# Patient Record
Sex: MALE | Race: OTHER | NOT HISPANIC OR LATINO | URBAN - METROPOLITAN AREA
[De-identification: names, ages, dates, MRNs, and addresses within clinical notes are randomized per-mention and may not be internally consistent; named-entity substitution may affect disease eponyms.]

---

## 2018-10-08 ENCOUNTER — INPATIENT (INPATIENT)
Facility: HOSPITAL | Age: 43
LOS: 1 days | Discharge: HOME | DRG: 287 | End: 2018-10-10
Attending: INTERNAL MEDICINE | Admitting: INTERNAL MEDICINE
Payer: COMMERCIAL

## 2018-10-08 VITALS
HEART RATE: 86 BPM | RESPIRATION RATE: 18 BRPM | SYSTOLIC BLOOD PRESSURE: 158 MMHG | WEIGHT: 259.26 LBS | OXYGEN SATURATION: 95 % | HEIGHT: 69 IN | DIASTOLIC BLOOD PRESSURE: 111 MMHG | TEMPERATURE: 99 F

## 2018-10-08 PROCEDURE — 99285 EMERGENCY DEPT VISIT HI MDM: CPT | Mod: 25

## 2018-10-08 PROCEDURE — 71045 X-RAY EXAM CHEST 1 VIEW: CPT | Mod: 26

## 2018-10-08 PROCEDURE — 93010 ELECTROCARDIOGRAM REPORT: CPT

## 2018-10-08 RX ORDER — AMLODIPINE BESYLATE 2.5 MG/1
10 TABLET ORAL ONCE
Qty: 0 | Refills: 0 | Status: COMPLETED | OUTPATIENT
Start: 2018-10-08 | End: 2018-10-08

## 2018-10-08 RX ORDER — CLOPIDOGREL BISULFATE 75 MG/1
300 TABLET, FILM COATED ORAL ONCE
Qty: 0 | Refills: 0 | Status: COMPLETED | OUTPATIENT
Start: 2018-10-08 | End: 2018-10-08

## 2018-10-08 RX ORDER — METOPROLOL TARTRATE 50 MG
50 TABLET ORAL ONCE
Qty: 0 | Refills: 0 | Status: COMPLETED | OUTPATIENT
Start: 2018-10-08 | End: 2018-10-08

## 2018-10-08 RX ORDER — ASPIRIN/CALCIUM CARB/MAGNESIUM 324 MG
325 TABLET ORAL ONCE
Qty: 0 | Refills: 0 | Status: COMPLETED | OUTPATIENT
Start: 2018-10-08 | End: 2018-10-08

## 2018-10-08 RX ADMIN — Medication 325 MILLIGRAM(S): at 22:20

## 2018-10-08 RX ADMIN — Medication 50 MILLIGRAM(S): at 22:20

## 2018-10-08 RX ADMIN — AMLODIPINE BESYLATE 10 MILLIGRAM(S): 2.5 TABLET ORAL at 22:20

## 2018-10-08 RX ADMIN — CLOPIDOGREL BISULFATE 300 MILLIGRAM(S): 75 TABLET, FILM COATED ORAL at 22:20

## 2018-10-08 NOTE — ED ADULT NURSE NOTE - NSIMPLEMENTINTERV_GEN_ALL_ED
Implemented All Universal Safety Interventions:  [place] to call system. Call bell, personal items and telephone within reach. Instruct patient to call for assistance. Room bathroom lighting operational. Non-slip footwear when patient is off stretcher. Physically safe environment: no spills, clutter or unnecessary equipment. Stretcher in lowest position, wheels locked, appropriate side rails in place.

## 2018-10-08 NOTE — ED PROVIDER NOTE - OBJECTIVE STATEMENT
pt to ed co pain to chest waxes and wanes and pain worse with exertion and better with rest pt has no fam hx however HX of DM, HTN, smokes, sent in by cards for cath in am no fever no dizzy no headache no chills no NVD no chest pain no SOB no shakes no aches no other  injury no other complaints 8/10 no radiation  onset sudden dull pain no pain now

## 2018-10-08 NOTE — ED PROVIDER NOTE - ATTENDING CONTRIBUTION TO CARE
I have seen and examined the pt, reviewed all pertinent clinical data, and I agree with the documentation/care/plan executed by CECIL Rodriguez.

## 2018-10-08 NOTE — ED ADULT NURSE NOTE - OBJECTIVE STATEMENT
pt c/o several weeks of intermittent chest pain.  seen by pmd with inconclusive work up.  states pain is random and sharp.  subsides spontaneously after 10-15 mins.  mild sob with pain.

## 2018-10-09 DIAGNOSIS — E78.5 HYPERLIPIDEMIA, UNSPECIFIED: ICD-10-CM

## 2018-10-09 DIAGNOSIS — Z29.9 ENCOUNTER FOR PROPHYLACTIC MEASURES, UNSPECIFIED: ICD-10-CM

## 2018-10-09 DIAGNOSIS — Z91.89 OTHER SPECIFIED PERSONAL RISK FACTORS, NOT ELSEWHERE CLASSIFIED: ICD-10-CM

## 2018-10-09 DIAGNOSIS — E11.9 TYPE 2 DIABETES MELLITUS WITHOUT COMPLICATIONS: ICD-10-CM

## 2018-10-09 DIAGNOSIS — I10 ESSENTIAL (PRIMARY) HYPERTENSION: ICD-10-CM

## 2018-10-09 DIAGNOSIS — J45.909 UNSPECIFIED ASTHMA, UNCOMPLICATED: ICD-10-CM

## 2018-10-09 LAB
ANION GAP SERPL CALC-SCNC: 12 MMOL/L — SIGNIFICANT CHANGE UP (ref 5–17)
APTT BLD: 30.9 SEC — SIGNIFICANT CHANGE UP (ref 27.5–37.4)
BUN SERPL-MCNC: 16 MG/DL — SIGNIFICANT CHANGE UP (ref 7–23)
CALCIUM SERPL-MCNC: 9.2 MG/DL — SIGNIFICANT CHANGE UP (ref 8.4–10.5)
CHLORIDE SERPL-SCNC: 98 MMOL/L — SIGNIFICANT CHANGE UP (ref 96–108)
CHOLEST SERPL-MCNC: 170 MG/DL — SIGNIFICANT CHANGE UP (ref 10–199)
CK MB CFR SERPL CALC: 3.2 NG/ML — SIGNIFICANT CHANGE UP (ref 0–6.7)
CK SERPL-CCNC: 415 U/L — HIGH (ref 30–200)
CO2 SERPL-SCNC: 27 MMOL/L — SIGNIFICANT CHANGE UP (ref 22–31)
CREAT SERPL-MCNC: 0.97 MG/DL — SIGNIFICANT CHANGE UP (ref 0.5–1.3)
GLUCOSE SERPL-MCNC: 140 MG/DL — HIGH (ref 70–99)
HBA1C BLD-MCNC: 8.8 % — HIGH (ref 4–5.6)
HDLC SERPL-MCNC: 45 MG/DL — SIGNIFICANT CHANGE UP
INR BLD: 1.04 — SIGNIFICANT CHANGE UP (ref 0.88–1.16)
LIPID PNL WITH DIRECT LDL SERPL: 86 MG/DL — SIGNIFICANT CHANGE UP
MAGNESIUM SERPL-MCNC: 1.5 MG/DL — LOW (ref 1.6–2.6)
POTASSIUM SERPL-MCNC: 3.4 MMOL/L — LOW (ref 3.5–5.3)
POTASSIUM SERPL-SCNC: 3.4 MMOL/L — LOW (ref 3.5–5.3)
PROTHROM AB SERPL-ACNC: 11.6 SEC — SIGNIFICANT CHANGE UP (ref 9.8–12.7)
SODIUM SERPL-SCNC: 137 MMOL/L — SIGNIFICANT CHANGE UP (ref 135–145)
TOTAL CHOLESTEROL/HDL RATIO MEASUREMENT: 3.8 RATIO — SIGNIFICANT CHANGE UP (ref 3.4–9.6)
TRIGL SERPL-MCNC: 195 MG/DL — HIGH (ref 10–149)
TROPONIN T SERPL-MCNC: <0.01 NG/ML — SIGNIFICANT CHANGE UP (ref 0–0.01)
TROPONIN T SERPL-MCNC: <0.01 NG/ML — SIGNIFICANT CHANGE UP (ref 0–0.01)
TSH SERPL-MCNC: 4.49 UIU/ML — SIGNIFICANT CHANGE UP (ref 0.35–4.94)

## 2018-10-09 PROCEDURE — 93458 L HRT ARTERY/VENTRICLE ANGIO: CPT | Mod: 26

## 2018-10-09 PROCEDURE — 93571 IV DOP VEL&/PRESS C FLO 1ST: CPT | Mod: 26

## 2018-10-09 RX ORDER — ATORVASTATIN CALCIUM 80 MG/1
80 TABLET, FILM COATED ORAL AT BEDTIME
Qty: 0 | Refills: 0 | Status: DISCONTINUED | OUTPATIENT
Start: 2018-10-09 | End: 2018-10-10

## 2018-10-09 RX ORDER — INSULIN GLARGINE 100 [IU]/ML
23 INJECTION, SOLUTION SUBCUTANEOUS AT BEDTIME
Qty: 0 | Refills: 0 | Status: DISCONTINUED | OUTPATIENT
Start: 2018-10-09 | End: 2018-10-10

## 2018-10-09 RX ORDER — NIFEDIPINE 30 MG
90 TABLET, EXTENDED RELEASE 24 HR ORAL DAILY
Qty: 0 | Refills: 0 | Status: DISCONTINUED | OUTPATIENT
Start: 2018-10-10 | End: 2018-10-10

## 2018-10-09 RX ORDER — ACETAMINOPHEN 500 MG
975 TABLET ORAL ONCE
Qty: 0 | Refills: 0 | Status: COMPLETED | OUTPATIENT
Start: 2018-10-09 | End: 2018-10-09

## 2018-10-09 RX ORDER — PANTOPRAZOLE SODIUM 20 MG/1
40 TABLET, DELAYED RELEASE ORAL
Qty: 0 | Refills: 0 | Status: DISCONTINUED | OUTPATIENT
Start: 2018-10-09 | End: 2018-10-10

## 2018-10-09 RX ORDER — HYDROCORTISONE 20 MG
200 TABLET ORAL ONCE
Qty: 0 | Refills: 0 | Status: COMPLETED | OUTPATIENT
Start: 2018-10-09 | End: 2018-10-09

## 2018-10-09 RX ORDER — INSULIN LISPRO 100/ML
7 VIAL (ML) SUBCUTANEOUS
Qty: 0 | Refills: 0 | COMMUNITY

## 2018-10-09 RX ORDER — IPRATROPIUM/ALBUTEROL SULFATE 18-103MCG
3 AEROSOL WITH ADAPTER (GRAM) INHALATION EVERY 6 HOURS
Qty: 0 | Refills: 0 | Status: DISCONTINUED | OUTPATIENT
Start: 2018-10-09 | End: 2018-10-10

## 2018-10-09 RX ORDER — CLOPIDOGREL BISULFATE 75 MG/1
300 TABLET, FILM COATED ORAL ONCE
Qty: 0 | Refills: 0 | Status: COMPLETED | OUTPATIENT
Start: 2018-10-09 | End: 2018-10-09

## 2018-10-09 RX ORDER — ALBUTEROL 90 UG/1
1 AEROSOL, METERED ORAL
Qty: 0 | Refills: 0 | COMMUNITY

## 2018-10-09 RX ORDER — ATORVASTATIN CALCIUM 80 MG/1
1 TABLET, FILM COATED ORAL
Qty: 0 | Refills: 0 | COMMUNITY

## 2018-10-09 RX ORDER — DEXTROSE 50 % IN WATER 50 %
12.5 SYRINGE (ML) INTRAVENOUS ONCE
Qty: 0 | Refills: 0 | Status: DISCONTINUED | OUTPATIENT
Start: 2018-10-09 | End: 2018-10-09

## 2018-10-09 RX ORDER — NIFEDIPINE 30 MG
60 TABLET, EXTENDED RELEASE 24 HR ORAL ONCE
Qty: 0 | Refills: 0 | Status: COMPLETED | OUTPATIENT
Start: 2018-10-09 | End: 2018-10-09

## 2018-10-09 RX ORDER — ACETAMINOPHEN 500 MG
650 TABLET ORAL EVERY 6 HOURS
Qty: 0 | Refills: 0 | Status: DISCONTINUED | OUTPATIENT
Start: 2018-10-09 | End: 2018-10-10

## 2018-10-09 RX ORDER — MONTELUKAST 4 MG/1
10 TABLET, CHEWABLE ORAL AT BEDTIME
Qty: 0 | Refills: 0 | Status: DISCONTINUED | OUTPATIENT
Start: 2018-10-09 | End: 2018-10-10

## 2018-10-09 RX ORDER — DEXTROSE 50 % IN WATER 50 %
25 SYRINGE (ML) INTRAVENOUS ONCE
Qty: 0 | Refills: 0 | Status: DISCONTINUED | OUTPATIENT
Start: 2018-10-09 | End: 2018-10-09

## 2018-10-09 RX ORDER — ASPIRIN/CALCIUM CARB/MAGNESIUM 324 MG
81 TABLET ORAL DAILY
Qty: 0 | Refills: 0 | Status: DISCONTINUED | OUTPATIENT
Start: 2018-10-09 | End: 2018-10-10

## 2018-10-09 RX ORDER — GLUCAGON INJECTION, SOLUTION 0.5 MG/.1ML
1 INJECTION, SOLUTION SUBCUTANEOUS ONCE
Qty: 0 | Refills: 0 | Status: DISCONTINUED | OUTPATIENT
Start: 2018-10-09 | End: 2018-10-09

## 2018-10-09 RX ORDER — HYDROCHLOROTHIAZIDE 25 MG
25 TABLET ORAL DAILY
Qty: 0 | Refills: 0 | Status: DISCONTINUED | OUTPATIENT
Start: 2018-10-09 | End: 2018-10-10

## 2018-10-09 RX ORDER — MAGNESIUM SULFATE 500 MG/ML
2 VIAL (ML) INJECTION ONCE
Qty: 0 | Refills: 0 | Status: COMPLETED | OUTPATIENT
Start: 2018-10-09 | End: 2018-10-09

## 2018-10-09 RX ORDER — LISINOPRIL 2.5 MG/1
20 TABLET ORAL DAILY
Qty: 0 | Refills: 0 | Status: DISCONTINUED | OUTPATIENT
Start: 2018-10-09 | End: 2018-10-10

## 2018-10-09 RX ORDER — GEMFIBROZIL 600 MG
1 TABLET ORAL
Qty: 0 | Refills: 0 | COMMUNITY

## 2018-10-09 RX ORDER — MONTELUKAST 4 MG/1
1 TABLET, CHEWABLE ORAL
Qty: 0 | Refills: 0 | COMMUNITY

## 2018-10-09 RX ORDER — POTASSIUM CHLORIDE 20 MEQ
40 PACKET (EA) ORAL EVERY 4 HOURS
Qty: 0 | Refills: 0 | Status: COMPLETED | OUTPATIENT
Start: 2018-10-09 | End: 2018-10-09

## 2018-10-09 RX ORDER — GEMFIBROZIL 600 MG
600 TABLET ORAL
Qty: 0 | Refills: 0 | Status: DISCONTINUED | OUTPATIENT
Start: 2018-10-09 | End: 2018-10-10

## 2018-10-09 RX ORDER — METOPROLOL TARTRATE 50 MG
50 TABLET ORAL EVERY 12 HOURS
Qty: 0 | Refills: 0 | Status: DISCONTINUED | OUTPATIENT
Start: 2018-10-09 | End: 2018-10-10

## 2018-10-09 RX ORDER — INSULIN GLARGINE 100 [IU]/ML
45 INJECTION, SOLUTION SUBCUTANEOUS
Qty: 0 | Refills: 0 | COMMUNITY

## 2018-10-09 RX ORDER — SODIUM CHLORIDE 9 MG/ML
1000 INJECTION, SOLUTION INTRAVENOUS
Qty: 0 | Refills: 0 | Status: DISCONTINUED | OUTPATIENT
Start: 2018-10-09 | End: 2018-10-09

## 2018-10-09 RX ORDER — INSULIN GLARGINE 100 [IU]/ML
25 INJECTION, SOLUTION SUBCUTANEOUS ONCE
Qty: 0 | Refills: 0 | Status: COMPLETED | OUTPATIENT
Start: 2018-10-09 | End: 2018-10-09

## 2018-10-09 RX ORDER — BUDESONIDE AND FORMOTEROL FUMARATE DIHYDRATE 160; 4.5 UG/1; UG/1
2 AEROSOL RESPIRATORY (INHALATION)
Qty: 0 | Refills: 0 | Status: DISCONTINUED | OUTPATIENT
Start: 2018-10-09 | End: 2018-10-10

## 2018-10-09 RX ORDER — NIFEDIPINE 30 MG
30 TABLET, EXTENDED RELEASE 24 HR ORAL ONCE
Qty: 0 | Refills: 0 | Status: COMPLETED | OUTPATIENT
Start: 2018-10-09 | End: 2018-10-09

## 2018-10-09 RX ORDER — SODIUM CHLORIDE 9 MG/ML
500 INJECTION INTRAMUSCULAR; INTRAVENOUS; SUBCUTANEOUS
Qty: 0 | Refills: 0 | Status: DISCONTINUED | OUTPATIENT
Start: 2018-10-09 | End: 2018-10-10

## 2018-10-09 RX ORDER — FLUTICASONE PROPIONATE 50 MCG
1 SPRAY, SUSPENSION NASAL DAILY
Qty: 0 | Refills: 0 | Status: DISCONTINUED | OUTPATIENT
Start: 2018-10-09 | End: 2018-10-10

## 2018-10-09 RX ORDER — MOMETASONE FUROATE 50 UG/1
1 SPRAY NASAL
Qty: 0 | Refills: 0 | COMMUNITY

## 2018-10-09 RX ORDER — DEXTROSE 50 % IN WATER 50 %
15 SYRINGE (ML) INTRAVENOUS ONCE
Qty: 0 | Refills: 0 | Status: DISCONTINUED | OUTPATIENT
Start: 2018-10-09 | End: 2018-10-09

## 2018-10-09 RX ORDER — INSULIN LISPRO 100/ML
VIAL (ML) SUBCUTANEOUS EVERY 6 HOURS
Qty: 0 | Refills: 0 | Status: DISCONTINUED | OUTPATIENT
Start: 2018-10-09 | End: 2018-10-09

## 2018-10-09 RX ORDER — ASPIRIN/CALCIUM CARB/MAGNESIUM 324 MG
1 TABLET ORAL
Qty: 0 | Refills: 0 | COMMUNITY

## 2018-10-09 RX ORDER — INSULIN LISPRO 100/ML
VIAL (ML) SUBCUTANEOUS EVERY 6 HOURS
Qty: 0 | Refills: 0 | Status: DISCONTINUED | OUTPATIENT
Start: 2018-10-09 | End: 2018-10-10

## 2018-10-09 RX ORDER — INFLUENZA VIRUS VACCINE 15; 15; 15; 15 UG/.5ML; UG/.5ML; UG/.5ML; UG/.5ML
0.5 SUSPENSION INTRAMUSCULAR ONCE
Qty: 0 | Refills: 0 | Status: COMPLETED | OUTPATIENT
Start: 2018-10-09 | End: 2018-10-10

## 2018-10-09 RX ORDER — FLUTICASONE PROPIONATE 220 MCG
1 AEROSOL WITH ADAPTER (GRAM) INHALATION
Qty: 0 | Refills: 0 | COMMUNITY

## 2018-10-09 RX ORDER — METOPROLOL TARTRATE 50 MG
1 TABLET ORAL
Qty: 0 | Refills: 0 | COMMUNITY

## 2018-10-09 RX ORDER — IPRATROPIUM/ALBUTEROL SULFATE 18-103MCG
3 AEROSOL WITH ADAPTER (GRAM) INHALATION ONCE
Qty: 0 | Refills: 0 | Status: COMPLETED | OUTPATIENT
Start: 2018-10-09 | End: 2018-10-09

## 2018-10-09 RX ORDER — OMEPRAZOLE 10 MG/1
1 CAPSULE, DELAYED RELEASE ORAL
Qty: 0 | Refills: 0 | COMMUNITY

## 2018-10-09 RX ADMIN — Medication 2: at 20:00

## 2018-10-09 RX ADMIN — ATORVASTATIN CALCIUM 80 MILLIGRAM(S): 80 TABLET, FILM COATED ORAL at 22:53

## 2018-10-09 RX ADMIN — Medication 2: at 05:49

## 2018-10-09 RX ADMIN — Medication 975 MILLIGRAM(S): at 05:48

## 2018-10-09 RX ADMIN — Medication 3 MILLILITER(S): at 07:48

## 2018-10-09 RX ADMIN — Medication 60 MILLIGRAM(S): at 11:00

## 2018-10-09 RX ADMIN — Medication 50 MILLIGRAM(S): at 19:54

## 2018-10-09 RX ADMIN — Medication 40 MILLIEQUIVALENT(S): at 10:31

## 2018-10-09 RX ADMIN — Medication 650 MILLIGRAM(S): at 11:01

## 2018-10-09 RX ADMIN — Medication 50 GRAM(S): at 10:31

## 2018-10-09 RX ADMIN — Medication 200 MILLIGRAM(S): at 11:01

## 2018-10-09 RX ADMIN — INSULIN GLARGINE 23 UNIT(S): 100 INJECTION, SOLUTION SUBCUTANEOUS at 22:53

## 2018-10-09 RX ADMIN — Medication 6: at 11:41

## 2018-10-09 RX ADMIN — Medication 81 MILLIGRAM(S): at 14:12

## 2018-10-09 RX ADMIN — MONTELUKAST 10 MILLIGRAM(S): 4 TABLET, CHEWABLE ORAL at 22:53

## 2018-10-09 RX ADMIN — BUDESONIDE AND FORMOTEROL FUMARATE DIHYDRATE 2 PUFF(S): 160; 4.5 AEROSOL RESPIRATORY (INHALATION) at 10:32

## 2018-10-09 RX ADMIN — Medication 30 MILLIGRAM(S): at 05:49

## 2018-10-09 RX ADMIN — Medication 40 MILLIEQUIVALENT(S): at 13:39

## 2018-10-09 RX ADMIN — Medication 3 MILLILITER(S): at 01:06

## 2018-10-09 RX ADMIN — SODIUM CHLORIDE 75 MILLILITER(S): 9 INJECTION INTRAMUSCULAR; INTRAVENOUS; SUBCUTANEOUS at 19:20

## 2018-10-09 RX ADMIN — Medication 650 MILLIGRAM(S): at 11:50

## 2018-10-09 RX ADMIN — Medication 25 MILLIGRAM(S): at 13:39

## 2018-10-09 RX ADMIN — Medication 1 SPRAY(S): at 15:05

## 2018-10-09 RX ADMIN — LISINOPRIL 20 MILLIGRAM(S): 2.5 TABLET ORAL at 13:39

## 2018-10-09 RX ADMIN — Medication 50 MILLIGRAM(S): at 05:50

## 2018-10-09 RX ADMIN — Medication 650 MILLIGRAM(S): at 22:59

## 2018-10-09 RX ADMIN — SODIUM CHLORIDE 75 MILLILITER(S): 9 INJECTION INTRAMUSCULAR; INTRAVENOUS; SUBCUTANEOUS at 13:40

## 2018-10-09 RX ADMIN — Medication 600 MILLIGRAM(S): at 22:53

## 2018-10-09 RX ADMIN — INSULIN GLARGINE 25 UNIT(S): 100 INJECTION, SOLUTION SUBCUTANEOUS at 02:28

## 2018-10-09 RX ADMIN — CLOPIDOGREL BISULFATE 300 MILLIGRAM(S): 75 TABLET, FILM COATED ORAL at 11:00

## 2018-10-09 RX ADMIN — BUDESONIDE AND FORMOTEROL FUMARATE DIHYDRATE 2 PUFF(S): 160; 4.5 AEROSOL RESPIRATORY (INHALATION) at 22:57

## 2018-10-09 NOTE — H&P ADULT - PROBLEM SELECTOR PLAN 2
Pt reports taking Lantus 45 units and Lispro 7 units premeal at home as well as metformin.  Will reduce lantus by 50% while NPO.  - Lantus 25 units  - MISS  - HbA1c

## 2018-10-09 NOTE — H&P ADULT - HISTORY OF PRESENT ILLNESS
44 yo male PMH DM, HTN, HLD, current smoker sent to [facility] ED by cardiologist for exertional chest pain.  Chest pain described as "tightness," intermittent, worse with exertion and better with rest. Denies lightheadedness, diaphoresis, N/V, SOB, radiation of pain, orthopnea, LE edema. No family history history of CAD.  Currently no CP.    Vital Signs: TMax: 98.7, HR (76 - 86), BP: (147/109 - 158/111), RR: 18, SpO2: (93% - 95%) on RA.  Labs unremarkable.  Troponin negative x 1.  EKG with T wave inversion in III and 44 yo male PMH DM, HTN, HLD, nephrotic syndrome, current smoker sent to [facility] ED by cardiologist for exertional chest pain.  Chest pain described as "poking," intermittent, worse with exertion and better with rest.  Pain lasts about 5 min and is associated with SOB. Denies lightheadedness, diaphoresis, N/V, radiation of pain, orthopnea, LE edema. No family history of CAD.  Currently no CP.    Vital Signs: TMax: 98.7, HR (76 - 86), BP: (147/109 - 158/111), RR: 18, SpO2: (93% - 95%) on RA.  Labs unremarkable.  Troponin negative x 1.  EKG with T wave inversion in III and ST elevation in V3.  Patient received aspirin 325 and clopidogrel 300 mg in ED.

## 2018-10-09 NOTE — ED ADULT NURSE REASSESSMENT NOTE - NS ED NURSE REASSESS COMMENT FT1
pt remains aaox3 no deficits no sob no chest pain no n/v.  oob to BR and felt slightly wheezy upon return to stretcher.  no sob.  duo neb given.

## 2018-10-09 NOTE — ED ADULT NURSE REASSESSMENT NOTE - NS ED NURSE REASSESS COMMENT FT1
pt aaox3 no deficits no sob no chest pain no n/v.  oob to BR.  refusing to keep cardiac monitor on at this time.  will let RN apply but then removes himself.  iv intact.  remains npo for cardiac cath.

## 2018-10-09 NOTE — H&P ADULT - PROBLEM SELECTOR PLAN 1
Pt with exertional CP sent in by his cardiologist for cardiac cath.  Currently asymptomatic with troponin negative x 2.  S/p aspirin 325 mg and clopidogrel 300 mg in ED.  - Troponin x 3  - F/u lipid panel, A1c, TSH  - Cardiac catheterization in AM  - Continue Atorvastatin 80 mg daily  - Continue Metoprolol 50 mg BID (patient needs med rec)

## 2018-10-09 NOTE — H&P ADULT - ASSESSMENT
44 yo male PMH DM, HTN, smoker sent to [facility] ED by cardiologist for exertional chest pain admitted for r/o ACS.

## 2018-10-09 NOTE — H&P ADULT - PROBLEM SELECTOR PLAN 3
Pt received amlodipine 10 mg PO in ED for hypertension.  Pt unsure of home regimen, which he states was recently changed.  Appears to be on nifedipine 90 mg and lisinopril/HCTZ.  Will hold ACE pending cath.  - Continue with Nifedipine 90 mg daily

## 2018-10-09 NOTE — H&P ADULT - NSHPLABSRESULTS_GEN_ALL_CORE
LABS:                         14.4   8.0   )-----------( 308      ( 08 Oct 2018 20:15 )             43.1     10-08    135  |  97  |  16  ----------------------------<  219<H>  4.0   |  26  |  0.95    Ca    9.5      08 Oct 2018 20:15    TPro  7.0  /  Alb  3.7  /  TBili  0.3  /  DBili  x   /  AST  25  /  ALT  33  /  AlkPhos  84  10-08    PT/INR - ( 08 Oct 2018 20:15 )   PT: 11.2 sec;   INR: 1.01        PTT - ( 08 Oct 2018 20:15 )  PTT:32.5 sec    CARDIAC MARKERS ( 08 Oct 2018 20:15 )  x     / <0.01 ng/mL / x     / x     / x        RADIOLOGY, EKG & ADDITIONAL TESTS: Reviewed.

## 2018-10-09 NOTE — H&P ADULT - PROBLEM SELECTOR PLAN 7
1) PCP Contacted on Admission: (Y/N) --> Name & Phone #:  2) Date of Contact with PCP: TBD  3) PCP Contacted at Discharge: TBD  4) Summary of Handoff Given to PCP: TBD   5) Post-Discharge Appointment Date: TBD

## 2018-10-09 NOTE — PROGRESS NOTE ADULT - SUBJECTIVE AND OBJECTIVE BOX
Interventional Cardiology PA Precath Note      HPI:   44 yo male PMH DM, HTN, HLD, nephrotic syndrome, current smoker sent to [facility] ED by cardiologist for exertional chest pain.  Chest pain described as"poking," intermittent, worse with exertion and better with rest.  Pain lasts about 5 min and is associated with SOB. Denies lightheadedness, diaphoresis, N/V, radiation of pain, orthopnea, LE edema. No family history of CAD.  Currently no CP. Vital Signs: TMax: 98.7, HR (76 - 86), BP: (147/109 - 158/111), RR: 18, SpO2: (93% - 95%) on RA.  Labs unremarkable.  Troponin negative x 1.  EKG with T wave inversion in III and ST elevation in V3.  Patient received aspirin 325 and clopidogrel 300 mg in ED. (09 Oct 2018 00:16)    Anginal class 3      PMH: DM, HTN, HLD, nephrotic syndrome, active smoker      ALL: NKDA, NKFA. Denies shellfish/Contrast dye allergy.    SocHX: Denies EtoH/TOB/IVDU    MEDS:   ALBUTerol/ipratropium for Nebulization 3 milliLiter(s) Nebulizer every 6 hours PRN  atorvastatin 80 milliGRAM(s) Oral at bedtime  buDESOnide  80 MICROgram(s)/formoterol 4.5 MICROgram(s) Inhaler 2 Puff(s) Inhalation two times a day  insulin lispro (HumaLOG) corrective regimen sliding scale   SubCutaneous every 6 hours  magnesium sulfate  IVPB 2 Gram(s) IV Intermittent once  metoprolol tartrate 50 milliGRAM(s) Oral every 12 hours  potassium chloride    Tablet ER 40 milliEquivalent(s) Oral every 4 hours    T(C): 36.7 (10-09-18 @ 05:57), Max: 37.1 (10-08-18 @ 19:48)  HR: 77 (10-09-18 @ 05:57) (76 - 86)  BP: 179/117 (10-09-18 @ 05:57) (146/101 - 179/117)  RR: 18 (10-09-18 @ 05:57) (18 - 18)  SpO2: 95% (10-09-18 @ 05:57) (93% - 95%)  Wt(kg): 117.6kg      NECK: No JVD, No carotid bruits B/L, +2 Carotid pulses B/L  PULM:  CTA B/L No W/R/R  CARD: RRR, +S1, +S2, No M/R/G  ABD: ND, +BS, NT, no masses  EXT: Warm, pedal edema yes/no, pitting/non-pitting  NEURO: A & O x 3, no focal neurologic deficits  PULSES:	   B	2+       R 2+	                FEM      2+   	                                 DP/PT 2+          Labs:                         14.4   8.0   )-----------( 308      ( 08 Oct 2018 20:15 )             43.1     10-09    137  |  98  |  16  ----------------------------<  140<H>  3.4<L>   |  27  |  0.97    Ca    9.2      09 Oct 2018 07:00  Mg     1.5     10-09    TPro  7.0  /  Alb  3.7  /  TBili  0.3  /  DBili  x   /  AST  25  /  ALT  33  /  AlkPhos  84  10-08    CARDIAC MARKERS ( 09 Oct 2018 07:00 )  x     / <0.01 ng/mL / 415 U/L / x     / 3.2 ng/mL  CARDIAC MARKERS ( 09 Oct 2018 01:00 )  x     / <0.01 ng/mL / x     / x     / x      CARDIAC MARKERS ( 08 Oct 2018 20:15 )  x     / <0.01 ng/mL / x     / x     / x              EKG: with T wave inversion in III and ST elevation in V3					  ASA III				Mallampati class: III	            Anginal Class: III    A/P: 44 yo male PMH DM, HTN, HLD, nephrotic syndrome, current smoker who presents for cardiac catheterization secondary to CCS III anginal symptoms and abnormal EKG    Precath consented  Pt loaded with Plavix 300mg POx1 and ASA 325mg POx1 in ED  Started IVF NS @ 75cc/h      Sedation Plan: Moderate     Patient Is Suitable Candidate For Sedation?  No       Risks & benefits of procedure and sedation and risks and benefits for the alternative therapy have been explained to the patient in layman’s terms including but not limited to: allergic reaction, bleeding, infection, arrhythmia, respiratory compromise, renal and vascular compromise, limb damage, MI, CVA, emergent CABG/Vascular Surgery and death. Informed consent obtained and in chart. Interventional Cardiology PA Precath Note      HPI:   42 yo male PMH DM, HTN, HLD, nephrotic syndrome, current smoker sent to [facility] ED by cardiologist for exertional chest pain.  Chest pain described as"poking," intermittent, worse with exertion and better with rest.  Pain lasts about 5 min and is associated with SOB. Denies lightheadedness, diaphoresis, N/V, radiation of pain, orthopnea, LE edema. No family history of CAD.  Currently no CP. Vital Signs: TMax: 98.7, HR (76 - 86), BP: (147/109 - 158/111), RR: 18, SpO2: (93% - 95%) on RA.  Labs unremarkable.  Troponin negative x 1.  EKG with T wave inversion in III and ST elevation in V3.  Patient received aspirin 325 and clopidogrel 300 mg in ED.     Anginal class 3      PMH: DM, HTN, HLD, nephrotic syndrome, active smoker      ALL: Shellfish allergy; itchiness of throat    SocHX: Denies EtoH/TOB/IVDU    MEDS:   ALBUTerol/ipratropium for Nebulization 3 milliLiter(s) Nebulizer every 6 hours PRN  atorvastatin 80 milliGRAM(s) Oral at bedtime  buDESOnide  80 MICROgram(s)/formoterol 4.5 MICROgram(s) Inhaler 2 Puff(s) Inhalation two times a day  insulin lispro (HumaLOG) corrective regimen sliding scale   SubCutaneous every 6 hours  magnesium sulfate  IVPB 2 Gram(s) IV Intermittent once  metoprolol tartrate 50 milliGRAM(s) Oral every 12 hours  potassium chloride    Tablet ER 40 milliEquivalent(s) Oral every 4 hours    T(C): 36.7 (10-09-18 @ 05:57), Max: 37.1 (10-08-18 @ 19:48)  HR: 77 (10-09-18 @ 05:57) (76 - 86)  BP: 179/117 (10-09-18 @ 05:57) (146/101 - 179/117)  RR: 18 (10-09-18 @ 05:57) (18 - 18)  SpO2: 95% (10-09-18 @ 05:57) (93% - 95%)  Wt(kg): 117.6kg      NECK: No JVD, No carotid bruits B/L, +2 Carotid pulses B/L  PULM:  CTA B/L No W/R/R  CARD: RRR, +S1, +S2, No M/R/G  ABD: ND, +BS, NT, no masses  EXT: Warm, pedal edema no  NEURO: A & O x 3, no focal neurologic deficits  PULSES:	   B	2+       R 2+	                FEM      2+   	                                 DP/PT 2+          Labs:                         14.4   8.0   )-----------( 308      ( 08 Oct 2018 20:15 )             43.1     10-09    137  |  98  |  16  ----------------------------<  140<H>  3.4<L>   |  27  |  0.97    Ca    9.2      09 Oct 2018 07:00  Mg     1.5     10-09    TPro  7.0  /  Alb  3.7  /  TBili  0.3  /  DBili  x   /  AST  25  /  ALT  33  /  AlkPhos  84  10-08    CARDIAC MARKERS ( 09 Oct 2018 07:00 )  x     / <0.01 ng/mL / 415 U/L / x     / 3.2 ng/mL  CARDIAC MARKERS ( 09 Oct 2018 01:00 )  x     / <0.01 ng/mL / x     / x     / x      CARDIAC MARKERS ( 08 Oct 2018 20:15 )  x     / <0.01 ng/mL / x     / x     / x              EKG: with T wave inversion in III and ST elevation in V3					  ASA III				Mallampati class: III	            Anginal Class: III    A/P: 42 yo male PMH DM, HTN, HLD, nephrotic syndrome, current smoker who presents for cardiac catheterization secondary to CCS III anginal symptoms and abnormal EKG    Precath consented  Pt loaded with Plavix 300mg POx1 and ASA 325mg POx1 in ED, given an additional Plavix 300mg POx1 per Dr. Mendoza  Started IVF NS @ 75cc/h      Sedation Plan: Moderate     Patient Is Suitable Candidate For Sedation?  No       Risks & benefits of procedure and sedation and risks and benefits for the alternative therapy have been explained to the patient in layman’s terms including but not limited to: allergic reaction, bleeding, infection, arrhythmia, respiratory compromise, renal and vascular compromise, limb damage, MI, CVA, emergent CABG/Vascular Surgery and death. Informed consent obtained and in chart. Interventional Cardiology PA Precath Note      HPI:   44 yo male PMH DM, HTN, HLD, nephrotic syndrome, current smoker sent to [facility] ED by cardiologist for exertional chest pain.  Chest pain described as"poking," intermittent, worse with exertion and better with rest.  Pain lasts about 5 min and is associated with SOB. Denies lightheadedness, diaphoresis, N/V, radiation of pain, orthopnea, LE edema. No family history of CAD.  Currently no CP. Vital Signs: TMax: 98.7, HR (76 - 86), BP: (147/109 - 158/111), RR: 18, SpO2: (93% - 95%) on RA.  Labs unremarkable.  Troponin negative x 1.  EKG with T wave inversion in III and ST elevation in V3.  Patient received aspirin 325 and clopidogrel 300 mg in ED.     Anginal class 3      PMH: DM, HTN, HLD, nephrotic syndrome, active smoker      ALL: Shellfish allergy; itchiness of throat, swollen glands and SOB    SocHX: Denies EtoH/TOB/IVDU    MEDS:   ALBUTerol/ipratropium for Nebulization 3 milliLiter(s) Nebulizer every 6 hours PRN  atorvastatin 80 milliGRAM(s) Oral at bedtime  buDESOnide  80 MICROgram(s)/formoterol 4.5 MICROgram(s) Inhaler 2 Puff(s) Inhalation two times a day  insulin lispro (HumaLOG) corrective regimen sliding scale   SubCutaneous every 6 hours  magnesium sulfate  IVPB 2 Gram(s) IV Intermittent once  metoprolol tartrate 50 milliGRAM(s) Oral every 12 hours  potassium chloride    Tablet ER 40 milliEquivalent(s) Oral every 4 hours    T(C): 36.7 (10-09-18 @ 05:57), Max: 37.1 (10-08-18 @ 19:48)  HR: 77 (10-09-18 @ 05:57) (76 - 86)  BP: 179/117 (10-09-18 @ 05:57) (146/101 - 179/117)  RR: 18 (10-09-18 @ 05:57) (18 - 18)  SpO2: 95% (10-09-18 @ 05:57) (93% - 95%)  Wt(kg): 117.6kg      NECK: No JVD, No carotid bruits B/L, +2 Carotid pulses B/L  PULM:  CTA B/L No W/R/R  CARD: RRR, +S1, +S2, No M/R/G  ABD: ND, +BS, NT, no masses  EXT: Warm, pedal edema no  NEURO: A & O x 3, no focal neurologic deficits  PULSES:	   B	2+       R 2+	                FEM      2+   	                                 DP/PT 2+          Labs:                         14.4   8.0   )-----------( 308      ( 08 Oct 2018 20:15 )             43.1     10-09    137  |  98  |  16  ----------------------------<  140<H>  3.4<L>   |  27  |  0.97    Ca    9.2      09 Oct 2018 07:00  Mg     1.5     10-09    TPro  7.0  /  Alb  3.7  /  TBili  0.3  /  DBili  x   /  AST  25  /  ALT  33  /  AlkPhos  84  10-08    CARDIAC MARKERS ( 09 Oct 2018 07:00 )  x     / <0.01 ng/mL / 415 U/L / x     / 3.2 ng/mL  CARDIAC MARKERS ( 09 Oct 2018 01:00 )  x     / <0.01 ng/mL / x     / x     / x      CARDIAC MARKERS ( 08 Oct 2018 20:15 )  x     / <0.01 ng/mL / x     / x     / x              EKG: with T wave inversion in III and ST elevation in V3					  ASA III				Mallampati class: III	            Anginal Class: III    A/P: 44 yo male PMH DM, HTN, HLD, nephrotic syndrome, current smoker who presents for cardiac catheterization secondary to CCS III anginal symptoms and abnormal EKG    Precath consented  Pt loaded with Plavix 300mg POx1 and ASA 325mg POx1 in ED, given an additional Plavix 300mg POx1 per Dr. Lizabeth Panchal aware  Started IVF NS @ 75cc/h  Pt with shellfish allergy; premedicated with IV Solu-cortef 200mg x1 per Dr. Barone      Sedation Plan: Moderate     Patient Is Suitable Candidate For Sedation?  No       Risks & benefits of procedure and sedation and risks and benefits for the alternative therapy have been explained to the patient in layman’s terms including but not limited to: allergic reaction, bleeding, infection, arrhythmia, respiratory compromise, renal and vascular compromise, limb damage, MI, CVA, emergent CABG/Vascular Surgery and death. Informed consent obtained and in chart.

## 2018-10-09 NOTE — H&P ADULT - NSHPPHYSICALEXAM_GEN_ALL_CORE
VITAL SIGNS:  T(C): 36.7 (10-08-18 @ 21:53), Max: 37.1 (10-08-18 @ 19:48)  T(F): 98 (10-08-18 @ 21:53), Max: 98.7 (10-08-18 @ 19:48)  HR: 76 (10-08-18 @ 21:53) (76 - 86)  BP: 147/109 (10-08-18 @ 21:53) (147/109 - 158/111)  BP(mean): --  RR: 18 (10-08-18 @ 21:53) (18 - 18)  SpO2: 93% (10-08-18 @ 21:53) (93% - 95%)  Wt(kg): --    PHYSICAL EXAM:    Constitutional: WDWN resting comfortably in bed; NAD  Head: NC/AT  Eyes: PERRL, EOMI, clear conjunctiva  ENT: no nasal discharge; uvula midline, no oropharyngeal erythema or exudates; MMM  Neck: supple; no JVD  Respiratory: CTA B/L; no W/R/R, no retractions  Cardiac: +S1/S2; RRR; no M/R/G  Gastrointestinal: soft, NT/ND; no rebound or guarding; +BSx4  Extremities: WWP, no clubbing or cyanosis; no peripheral edema  Musculoskeletal: NROM x4; no joint swelling, tenderness or erythema  Vascular: 2+ radial, DP/PT pulses B/L  Dermatologic: skin warm, dry and intact; no rashes, wounds, or scars  Lymphatic: no submandibular or cervical LAD  Neurologic: AAOx3; CNII-XII grossly intact; no focal deficits  Psychiatric: affect and characteristics of appearance, verbalizations, behaviors are appropriate VITAL SIGNS:  T(C): 36.7 (10-08-18 @ 21:53), Max: 37.1 (10-08-18 @ 19:48)  T(F): 98 (10-08-18 @ 21:53), Max: 98.7 (10-08-18 @ 19:48)  HR: 76 (10-08-18 @ 21:53) (76 - 86)  BP: 147/109 (10-08-18 @ 21:53) (147/109 - 158/111)  BP(mean): --  RR: 18 (10-08-18 @ 21:53) (18 - 18)  SpO2: 93% (10-08-18 @ 21:53) (93% - 95%)  Wt(kg): --    PHYSICAL EXAM:    Constitutional: obese male resting comfortably in bed; NAD  Head: NC/AT  Eyes: PERRL, EOMI, clear conjunctiva  ENT: no nasal discharge; uvula midline, no oropharyngeal erythema or exudates; MMM  Neck: supple; no JVD  Respiratory: CTA B/L; no W/R/R, no retractions  Cardiac: +S1/S2; RRR; no M/R/G  Gastrointestinal: soft, NT/ND; no rebound or guarding; +BSx4  Extremities: WWP, no clubbing or cyanosis; no peripheral edema  Musculoskeletal: NROM x4; no joint swelling, tenderness or erythema  Vascular: 2+ radial, DP/PT pulses B/L  Dermatologic: skin warm, dry and intact; no rashes, wounds, or scars  Lymphatic: no submandibular or cervical LAD  Neurologic: AAOx3; CNII-XII grossly intact; no focal deficits  Psychiatric: affect and characteristics of appearance, verbalizations, behaviors are appropriate

## 2018-10-10 VITALS — TEMPERATURE: 98 F

## 2018-10-10 LAB
ANION GAP SERPL CALC-SCNC: 14 MMOL/L — SIGNIFICANT CHANGE UP (ref 5–17)
BUN SERPL-MCNC: 19 MG/DL — SIGNIFICANT CHANGE UP (ref 7–23)
CALCIUM SERPL-MCNC: 9.2 MG/DL — SIGNIFICANT CHANGE UP (ref 8.4–10.5)
CHLORIDE SERPL-SCNC: 99 MMOL/L — SIGNIFICANT CHANGE UP (ref 96–108)
CO2 SERPL-SCNC: 24 MMOL/L — SIGNIFICANT CHANGE UP (ref 22–31)
CREAT SERPL-MCNC: 0.97 MG/DL — SIGNIFICANT CHANGE UP (ref 0.5–1.3)
GLUCOSE BLDC GLUCOMTR-MCNC: 142 MG/DL — HIGH (ref 70–99)
GLUCOSE BLDC GLUCOMTR-MCNC: 184 MG/DL — HIGH (ref 70–99)
GLUCOSE SERPL-MCNC: 136 MG/DL — HIGH (ref 70–99)
HCT VFR BLD CALC: 46.3 % — SIGNIFICANT CHANGE UP (ref 39–50)
HGB BLD-MCNC: 15.4 G/DL — SIGNIFICANT CHANGE UP (ref 13–17)
MAGNESIUM SERPL-MCNC: 1.7 MG/DL — SIGNIFICANT CHANGE UP (ref 1.6–2.6)
MCHC RBC-ENTMCNC: 27.4 PG — SIGNIFICANT CHANGE UP (ref 27–34)
MCHC RBC-ENTMCNC: 33.3 G/DL — SIGNIFICANT CHANGE UP (ref 32–36)
MCV RBC AUTO: 82.2 FL — SIGNIFICANT CHANGE UP (ref 80–100)
PLATELET # BLD AUTO: 334 K/UL — SIGNIFICANT CHANGE UP (ref 150–400)
POTASSIUM SERPL-MCNC: 3.4 MMOL/L — LOW (ref 3.5–5.3)
POTASSIUM SERPL-SCNC: 3.4 MMOL/L — LOW (ref 3.5–5.3)
RBC # BLD: 5.63 M/UL — SIGNIFICANT CHANGE UP (ref 4.2–5.8)
RBC # FLD: 13.6 % — SIGNIFICANT CHANGE UP (ref 10.3–16.9)
SODIUM SERPL-SCNC: 137 MMOL/L — SIGNIFICANT CHANGE UP (ref 135–145)
WBC # BLD: 9.7 K/UL — SIGNIFICANT CHANGE UP (ref 3.8–10.5)
WBC # FLD AUTO: 9.7 K/UL — SIGNIFICANT CHANGE UP (ref 3.8–10.5)

## 2018-10-10 RX ORDER — NIFEDIPINE 30 MG
1 TABLET, EXTENDED RELEASE 24 HR ORAL
Qty: 0 | Refills: 0 | COMMUNITY
Start: 2018-10-10

## 2018-10-10 RX ORDER — MAGNESIUM SULFATE 500 MG/ML
1 VIAL (ML) INJECTION ONCE
Qty: 0 | Refills: 0 | Status: COMPLETED | OUTPATIENT
Start: 2018-10-10 | End: 2018-10-10

## 2018-10-10 RX ORDER — IBUPROFEN 200 MG
1 TABLET ORAL
Qty: 0 | Refills: 0 | COMMUNITY

## 2018-10-10 RX ORDER — POTASSIUM CHLORIDE 20 MEQ
40 PACKET (EA) ORAL ONCE
Qty: 0 | Refills: 0 | Status: COMPLETED | OUTPATIENT
Start: 2018-10-10 | End: 2018-10-10

## 2018-10-10 RX ORDER — ACETAMINOPHEN 500 MG
650 TABLET ORAL ONCE
Qty: 0 | Refills: 0 | Status: COMPLETED | OUTPATIENT
Start: 2018-10-10 | End: 2018-10-10

## 2018-10-10 RX ADMIN — Medication 25 MILLIGRAM(S): at 06:01

## 2018-10-10 RX ADMIN — LISINOPRIL 20 MILLIGRAM(S): 2.5 TABLET ORAL at 06:01

## 2018-10-10 RX ADMIN — Medication 650 MILLIGRAM(S): at 09:30

## 2018-10-10 RX ADMIN — PANTOPRAZOLE SODIUM 40 MILLIGRAM(S): 20 TABLET, DELAYED RELEASE ORAL at 06:01

## 2018-10-10 RX ADMIN — Medication 2: at 11:21

## 2018-10-10 RX ADMIN — Medication 90 MILLIGRAM(S): at 11:19

## 2018-10-10 RX ADMIN — Medication 40 MILLIEQUIVALENT(S): at 10:09

## 2018-10-10 RX ADMIN — Medication 50 MILLIGRAM(S): at 06:01

## 2018-10-10 RX ADMIN — Medication 81 MILLIGRAM(S): at 11:21

## 2018-10-10 RX ADMIN — Medication 600 MILLIGRAM(S): at 11:19

## 2018-10-10 RX ADMIN — Medication 650 MILLIGRAM(S): at 08:34

## 2018-10-10 RX ADMIN — Medication 1 SPRAY(S): at 11:21

## 2018-10-10 RX ADMIN — BUDESONIDE AND FORMOTEROL FUMARATE DIHYDRATE 2 PUFF(S): 160; 4.5 AEROSOL RESPIRATORY (INHALATION) at 08:40

## 2018-10-10 RX ADMIN — Medication 650 MILLIGRAM(S): at 00:00

## 2018-10-10 RX ADMIN — Medication 100 GRAM(S): at 10:09

## 2018-10-10 RX ADMIN — INFLUENZA VIRUS VACCINE 0.5 MILLILITER(S): 15; 15; 15; 15 SUSPENSION INTRAMUSCULAR at 10:09

## 2018-10-10 NOTE — DISCHARGE NOTE ADULT - CARE PLAN
Principal Discharge DX:	Coronary artery disease  Goal:	Please follow up with Dr. Barone.  Call the office to make an appointment for follow up in 1-2 weeks  Secondary Diagnosis:	HTN (hypertension)  Secondary Diagnosis:	Hyperlipidemia  Secondary Diagnosis:	DM (diabetes mellitus) Principal Discharge DX:	Coronary artery disease  Goal:	Please follow up with Dr. Barone.  Call the office to make an appointment for follow up in 1-2 weeks  Assessment and plan of treatment:	You presented with chest pain.  You underwent a cardiac catheterization revealing a 50% blockage but no intervention was needed.    - Continue to take aspirin 81mg daily.  Continue your blood pressure and cholesterol medications as they help prevent your blockage from worsening.      You underwent a coronary angiogram and should wait 3 days before returning to ordinary activities. Do not drive for 2 days. Consult your doctor before returning to vigorous activity. You may return to work in 3-5 days. The catheter from your wrist was removed and you should remove the dressing in 24 hours. You may shower once the dressing is removed, but avoid baths, hot tubs, or swimming for 5 days to prevent infection. If you notice bleeding from the site, hardening and pain at the site, drainage or redness from the site, coolness/paleness of the extremity, swelling, or fever, please call [phone]  Secondary Diagnosis:	HTN (hypertension)  Goal:	Blood pressure is stable. continue home meds.  maintain a low salt diet.  Secondary Diagnosis:	Hyperlipidemia  Goal:	LDL is 86.  Your goal is less then 70.  Continue home meds and discuss with Dr. Barone.  Secondary Diagnosis:	DM (diabetes mellitus)  Goal:	Your Hgb A1c is 8.8.  Your goal is less then 7.  Please follow up with yoUr PMD about better treatment.

## 2018-10-10 NOTE — DISCHARGE NOTE ADULT - PLAN OF CARE
Please follow up with Dr. Barone.  Call the office to make an appointment for follow up in 1-2 weeks You presented with chest pain.  You underwent a cardiac catheterization revealing a 50% blockage but no intervention was needed.    - Continue to take aspirin 81mg daily.  Continue your blood pressure and cholesterol medications as they help prevent your blockage from worsening.      You underwent a coronary angiogram and should wait 3 days before returning to ordinary activities. Do not drive for 2 days. Consult your doctor before returning to vigorous activity. You may return to work in 3-5 days. The catheter from your wrist was removed and you should remove the dressing in 24 hours. You may shower once the dressing is removed, but avoid baths, hot tubs, or swimming for 5 days to prevent infection. If you notice bleeding from the site, hardening and pain at the site, drainage or redness from the site, coolness/paleness of the extremity, swelling, or fever, please call [phone] Blood pressure is stable. continue home meds.  maintain a low salt diet. LDL is 86.  Your goal is less then 70.  Continue home meds and discuss with Dr. Barone. Your Hgb A1c is 8.8.  Your goal is less then 7.  Please follow up with yoUr PMD about better treatment.

## 2018-10-10 NOTE — DISCHARGE NOTE ADULT - HOSPITAL COURSE
44 yo male PMH DM, HTN, smoker sent to [facility] ED by cardiologist for exertional chest pain admitted for r/o ACS.  CE negative.  s/p diagnostic CATH 10/9/18 revealing non obstructive CAD, 50%  mLAD (FFR 0.89), nl EF,  R radial site stable with no bleeding, hematoma, and pulses intact.  Patient feeling well this mroning.  Labs, Vitals, Meds reviewed with Dr. Maxwell and patient deemed stable for discharge home.  He is continued on medical management for CAD with his home medications.  He will follow up with Dr. Barone in 1-2 weeks for further medical management.

## 2018-10-10 NOTE — DISCHARGE NOTE ADULT - CARE PROVIDER_API CALL
Sav Barone), Cardiovascular Disease; Interventional Cardiology; Nuclear Cardiology  [address] [place] [address]  Phone: [phone]  Fax: [phone]

## 2018-10-10 NOTE — DISCHARGE NOTE ADULT - MEDICATION SUMMARY - MEDICATIONS TO TAKE
I will START or STAY ON the medications listed below when I get home from the hospital:    Aspirin Enteric Coated 81 mg oral delayed release tablet  -- 1 tab(s) by mouth once a day  -- Indication: For Coronary artery disease    Admelog SoloStar 100 units/mL injectable solution  -- 7 unit(s) injectable 3 times a day (before meals)  -- Indication: For Diabetes    Basaglar KwikPen  -- 45 unit(s) subcutaneous once a day (at bedtime)  -- Indication: For Diabetes    gemfibrozil 600 mg oral tablet  -- 1 tab(s) by mouth 2 times a day  -- Indication: For Cholesterol    Lipitor 40 mg oral tablet  -- 1 tab(s) by mouth once a day  -- Indication: For Cholesterol    lisinopril-hydroCHLOROthiazide 20 mg-25 mg oral tablet  -- 1 tab(s) by mouth once a day  -- Indication: For blood pressure    Toprol- mg oral tablet, extended release  -- 1 tab(s) by mouth once a day  -- Indication: For blood pressure    ProAir HFA 90 mcg/inh inhalation aerosol  -- 1 puff(s) inhaled 4 times a day, As Needed  -- Indication: For Asthma    NIFEdipine 90 mg oral tablet, extended release  -- 1 tab(s) by mouth once a day  -- Indication: For blood presure    Singulair 10 mg oral tablet  -- 1 tab(s) by mouth once a day  -- Indication: For Asthma    Nasonex 50 mcg/inh nasal spray  -- 1 spray(s) into nose 2 times a day  -- Indication: For As prescribed    omeprazole 20 mg oral delayed release capsule  -- 1 cap(s) by mouth once a day  -- Indication: For Acid reflux    Flovent  mcg/inh inhalation aerosol  -- 1 puff(s) inhaled 2 times a day  -- Indication: For Asthma

## 2018-10-10 NOTE — DISCHARGE NOTE ADULT - PATIENT PORTAL LINK FT
You can access the [facility] Patient Portal, offered by [facility], by registering with the following website: [email or web address]

## 2018-10-15 DIAGNOSIS — R07.9 CHEST PAIN, UNSPECIFIED: ICD-10-CM

## 2018-10-15 DIAGNOSIS — I10 ESSENTIAL (PRIMARY) HYPERTENSION: ICD-10-CM

## 2018-10-15 DIAGNOSIS — E11.65 TYPE 2 DIABETES MELLITUS WITH HYPERGLYCEMIA: ICD-10-CM

## 2018-10-15 DIAGNOSIS — F17.210 NICOTINE DEPENDENCE, CIGARETTES, UNCOMPLICATED: ICD-10-CM

## 2018-10-15 DIAGNOSIS — J45.909 UNSPECIFIED ASTHMA, UNCOMPLICATED: ICD-10-CM

## 2018-10-15 DIAGNOSIS — E78.5 HYPERLIPIDEMIA, UNSPECIFIED: ICD-10-CM

## 2018-10-15 DIAGNOSIS — I25.10 ATHEROSCLEROTIC HEART DISEASE OF NATIVE CORONARY ARTERY WITHOUT ANGINA PECTORIS: ICD-10-CM

## 2018-10-16 PROCEDURE — 80061 LIPID PANEL: CPT

## 2018-10-16 PROCEDURE — 87389 HIV-1 AG W/HIV-1&-2 AB AG IA: CPT

## 2018-10-16 PROCEDURE — 90686 IIV4 VACC NO PRSV 0.5 ML IM: CPT

## 2018-10-16 PROCEDURE — 99285 EMERGENCY DEPT VISIT HI MDM: CPT | Mod: 25

## 2018-10-16 PROCEDURE — 85730 THROMBOPLASTIN TIME PARTIAL: CPT

## 2018-10-16 PROCEDURE — 93005 ELECTROCARDIOGRAM TRACING: CPT

## 2018-10-16 PROCEDURE — 83036 HEMOGLOBIN GLYCOSYLATED A1C: CPT

## 2018-10-16 PROCEDURE — C1889: CPT

## 2018-10-16 PROCEDURE — 36415 COLL VENOUS BLD VENIPUNCTURE: CPT

## 2018-10-16 PROCEDURE — 80048 BASIC METABOLIC PNL TOTAL CA: CPT

## 2018-10-16 PROCEDURE — 85610 PROTHROMBIN TIME: CPT

## 2018-10-16 PROCEDURE — 82553 CREATINE MB FRACTION: CPT

## 2018-10-16 PROCEDURE — 82962 GLUCOSE BLOOD TEST: CPT

## 2018-10-16 PROCEDURE — 82550 ASSAY OF CK (CPK): CPT

## 2018-10-16 PROCEDURE — 85025 COMPLETE CBC W/AUTO DIFF WBC: CPT

## 2018-10-16 PROCEDURE — 85027 COMPLETE CBC AUTOMATED: CPT

## 2018-10-16 PROCEDURE — 94640 AIRWAY INHALATION TREATMENT: CPT

## 2018-10-16 PROCEDURE — 84443 ASSAY THYROID STIM HORMONE: CPT

## 2018-10-16 PROCEDURE — 80053 COMPREHEN METABOLIC PANEL: CPT

## 2018-10-16 PROCEDURE — C1887: CPT

## 2018-10-16 PROCEDURE — 83735 ASSAY OF MAGNESIUM: CPT

## 2018-10-16 PROCEDURE — 71045 X-RAY EXAM CHEST 1 VIEW: CPT

## 2018-10-16 PROCEDURE — C1769: CPT

## 2018-10-16 PROCEDURE — 84484 ASSAY OF TROPONIN QUANT: CPT

## 2022-03-28 ENCOUNTER — EMERGENCY (EMERGENCY)
Facility: HOSPITAL | Age: 47
LOS: 1 days | Discharge: HOME | End: 2022-03-28
Attending: EMERGENCY MEDICINE | Admitting: EMERGENCY MEDICINE
Payer: COMMERCIAL

## 2022-03-28 VITALS
SYSTOLIC BLOOD PRESSURE: 135 MMHG | TEMPERATURE: 98 F | OXYGEN SATURATION: 99 % | HEART RATE: 601 BPM | WEIGHT: 149.91 LBS | DIASTOLIC BLOOD PRESSURE: 97 MMHG | RESPIRATION RATE: 18 BRPM | HEIGHT: 69 IN

## 2022-03-28 VITALS — HEART RATE: 86 BPM

## 2022-03-28 DIAGNOSIS — I31.9 DISEASE OF PERICARDIUM, UNSPECIFIED: ICD-10-CM

## 2022-03-28 DIAGNOSIS — Z91.013 ALLERGY TO SEAFOOD: ICD-10-CM

## 2022-03-28 DIAGNOSIS — Z20.822 CONTACT WITH AND (SUSPECTED) EXPOSURE TO COVID-19: ICD-10-CM

## 2022-03-28 DIAGNOSIS — R07.9 CHEST PAIN, UNSPECIFIED: ICD-10-CM

## 2022-03-28 PROBLEM — E11.9 TYPE 2 DIABETES MELLITUS WITHOUT COMPLICATIONS: Chronic | Status: ACTIVE | Noted: 2018-10-08

## 2022-03-28 PROBLEM — J45.909 UNSPECIFIED ASTHMA, UNCOMPLICATED: Chronic | Status: ACTIVE | Noted: 2018-10-08

## 2022-03-28 PROBLEM — E78.5 HYPERLIPIDEMIA, UNSPECIFIED: Chronic | Status: ACTIVE | Noted: 2018-10-08

## 2022-03-28 PROBLEM — I10 ESSENTIAL (PRIMARY) HYPERTENSION: Chronic | Status: ACTIVE | Noted: 2018-10-08

## 2022-03-28 LAB
RAPID RVP RESULT: SIGNIFICANT CHANGE UP
SARS-COV-2 RNA SPEC QL NAA+PROBE: SIGNIFICANT CHANGE UP

## 2022-03-28 PROCEDURE — 85027 COMPLETE CBC AUTOMATED: CPT

## 2022-03-28 PROCEDURE — 96366 THER/PROPH/DIAG IV INF ADDON: CPT

## 2022-03-28 PROCEDURE — 80048 BASIC METABOLIC PNL TOTAL CA: CPT

## 2022-03-28 PROCEDURE — 96375 TX/PRO/DX INJ NEW DRUG ADDON: CPT

## 2022-03-28 PROCEDURE — 85730 THROMBOPLASTIN TIME PARTIAL: CPT

## 2022-03-28 PROCEDURE — 71045 X-RAY EXAM CHEST 1 VIEW: CPT | Mod: 26

## 2022-03-28 PROCEDURE — 36415 COLL VENOUS BLD VENIPUNCTURE: CPT

## 2022-03-28 PROCEDURE — 71045 X-RAY EXAM CHEST 1 VIEW: CPT

## 2022-03-28 PROCEDURE — 96365 THER/PROPH/DIAG IV INF INIT: CPT

## 2022-03-28 PROCEDURE — 0225U NFCT DS DNA&RNA 21 SARSCOV2: CPT

## 2022-03-28 PROCEDURE — 99284 EMERGENCY DEPT VISIT MOD MDM: CPT | Mod: 25

## 2022-03-28 PROCEDURE — 84484 ASSAY OF TROPONIN QUANT: CPT

## 2022-03-28 PROCEDURE — 85610 PROTHROMBIN TIME: CPT

## 2022-03-28 PROCEDURE — 99285 EMERGENCY DEPT VISIT HI MDM: CPT

## 2022-03-28 RX ORDER — KETOROLAC TROMETHAMINE 30 MG/ML
15 SYRINGE (ML) INJECTION ONCE
Refills: 0 | Status: DISCONTINUED | OUTPATIENT
Start: 2022-03-28 | End: 2022-03-28

## 2022-03-28 RX ORDER — IBUPROFEN 200 MG
1 TABLET ORAL
Qty: 28 | Refills: 0
Start: 2022-03-28 | End: 2022-04-03

## 2022-03-28 RX ORDER — COLCHICINE 0.6 MG
1 TABLET ORAL
Qty: 14 | Refills: 0
Start: 2022-03-28 | End: 2022-04-03

## 2022-03-28 RX ADMIN — Medication 1 MILLIGRAM(S): at 15:38

## 2022-03-28 RX ADMIN — Medication 15 MILLIGRAM(S): at 15:38

## 2022-03-28 RX ADMIN — Medication 15 MILLIGRAM(S): at 19:33

## 2022-03-28 NOTE — ED PROVIDER NOTE - CLINICAL SUMMARY MEDICAL DECISION MAKING FREE TEXT BOX
chest pain- constant x several days- nonexertional- ekg neg, trop neg- clinically c/w pericarditis- given age- will start meds- urgent cards  FU

## 2022-03-28 NOTE — ED PROVIDER NOTE - OBJECTIVE STATEMENT
47 M co chest pain- constant CP x 3 days- worse w laying flat, laughing, deep inspiration- no f/c no cough  no sob not worse with exertion- ho covid vax'd- also had covid in december  px states he is borderline DM- with asthma, HTN  mild-mod severity  no past mi/cva

## 2022-03-28 NOTE — ED PROVIDER NOTE - NSFOLLOWUPINSTRUCTIONS_ED_ALL_ED_FT
Nonspecific Chest Pain, Adult      Chest pain can be caused by many different conditions. It can be caused by a condition that is life-threatening and requires treatment right away. It can also be caused by something that is not life-threatening. If you have chest pain, it can be hard to know the difference, so it is important to get help right away to make sure that you do not have a serious condition.    Some life-threatening causes of chest pain include:  •Heart attack.      •A tear in the body's main blood vessel (aortic dissection).      •Inflammation around your heart (pericarditis).      •A problem in the lungs, such as a blood clot (pulmonary embolism) or a collapsed lung (pneumothorax).      Some non life-threatening causes of chest pain include:  •Heartburn.      •Anxiety or stress.      •Damage to the bones, muscles, and cartilage that make up your chest wall.      •Pneumonia or bronchitis.      •Shingles infection (varicella-zoster virus).      Chest pain can feel like:  •Pain or discomfort on the surface of your chest or deep in your chest.      •Crushing, pressure, aching, or squeezing pain.      •Burning or tingling.      •Dull or sharp pain that is worse when you move, cough, or take a deep breath.      •Pain or discomfort that is also felt in your back, neck, jaw, shoulder, or arm, or pain that spreads to any of these areas.      Your chest pain may come and go. It may also be constant. Your health care provider will do lab tests and other studies to find the cause of your pain. Treatment will depend on the cause of your chest pain.      Follow these instructions at home:    Medicines     •Take over-the-counter and prescription medicines only as told by your health care provider.      •If you were prescribed an antibiotic, take it as told by your health care provider. Do not stop taking the antibiotic even if you start to feel better.        Lifestyle      •Rest as directed by your health care provider.      • Do not use any products that contain nicotine or tobacco, such as cigarettes and e-cigarettes. If you need help quitting, ask your health care provider.      • Do not drink alcohol.    •Make healthy lifestyle choices as recommended. These may include:  •Getting regular exercise. Ask your health care provider to suggest some activities that are safe for you.      •Eating a heart-healthy diet. This includes plenty of fresh fruits and vegetables, whole grains, low-fat (lean) protein, and low-fat dairy products. A dietitian can help you find healthy eating options.      •Maintaining a healthy weight.      •Managing any other health conditions you have, such as high blood pressure (hypertension) or diabetes.      •Reducing stress, such as with yoga or relaxation techniques.        General instructions     •Pay attention to any changes in your symptoms. Tell your health care provider about them or any new symptoms.      •Avoid any activities that cause chest pain.      •Keep all follow-up visits as told by your health care provider. This is important. This includes visits for any further testing if your chest pain does not go away.        Contact a health care provider if:    •Your chest pain does not go away.      •You feel depressed.      •You have a fever.        Get help right away if:    •Your chest pain gets worse.      •You have a cough that gets worse, or you cough up blood.      •You have severe pain in your abdomen.      •You faint.      •You have sudden, unexplained chest discomfort.      •You have sudden, unexplained discomfort in your arms, back, neck, or jaw.      •You have shortness of breath at any time.      •You suddenly start to sweat, or your skin gets clammy.      •You feel nausea or you vomit.      •You suddenly feel lightheaded or dizzy.      •You have severe weakness, or unexplained weakness or fatigue.      •Your heart begins to beat quickly, or it feels like it is skipping beats.      These symptoms may represent a serious problem that is an emergency. Do not wait to see if the symptoms will go away. Get medical help right away. Call your local emergency services (911 in the [place] Do not drive yourself to the hospital.       Summary    •Chest pain can be caused by a condition that is serious and requires urgent treatment. It may also be caused by something that is not life-threatening.      •If you have chest pain, it is very important to see your health care provider. Your health care provider may do lab tests and other studies to find the cause of your pain.      •Follow your health care provider's instructions on taking medicines, making lifestyle changes, and getting emergency treatment if symptoms become worse.      •Keep all follow-up visits as told by your health care provider. This includes visits for any further testing if your chest pain does not go away.      This information is not intended to replace advice given to you by your health care provider. Make sure you discuss any questions you have with your health care provider.      Document Revised: 06/20/2019 Document Reviewed: 06/20/2019    Elsevier Patient Education © 2022 [employer]

## 2022-03-28 NOTE — ED PROVIDER NOTE - CHIEF COMPLAINT
Results   XR HIP LT 2V W PELVIS 1V   07 Jun 2017 11:07 AM  -   XR HIP LT 2V W PELVIS 1V  Accession #     [id number]     EXAM: XR HIP LT 2V W PELVIS 1V     CLINICAL INDICATION: Left anterior hip pain for six months     COMPARISON: None.     FINDINGS AND IMPRESSION:     No acute fracture or dislocation.  Loss of joint space and sclerosis of the left femoral head and   acetabulum, findings suggestive of moderate to severe chronic degenerative change of the left   hip.  Sclerosis of bilateral sacroiliac joints.  Partially visualized dextroscoliosis of the   lumbar spine.  Apparent right superior pelvic tilt may be related to patient positioning and/or   scoliosis.     IJEMMA MD, have reviewed the images and report and concur with these findings   interpreted by LOC LOCO M.D..     ****  F I N A L  ****     Transcribed By: ISAIAH   06/07/17 1:49 pm     Dictated By:            LOC DHILLON     Electronically Reviewed and Approved By:           JEMMA GLASGOW  06/07/17 4:50 pm.  Assessment   Hip pain (719.45) (M25.559).  Discussed   Want ORTHOPEDIC to see you     Dr Pineda.  Orders   Orthopedics Referral/Consult; Requested for: 07 Jun 2017.   The patient is a 47y Male complaining of chest pain.

## 2022-03-28 NOTE — ED PROVIDER NOTE - PATIENT PORTAL LINK FT
You can access the FollowMyHealth Patient Portal offered by [facility] by registering at the following website: [email or web address]. By joining [facility]’s FollowMyHealth portal, you will also be able to view your health information using other applications (apps) compatible with our system.

## 2022-03-28 NOTE — ED PROVIDER NOTE - CARDIAC, MLM
"SW contacted Tatum ([facility]).  Tatum stated: \"Looks like PT and OT have discharged pt because he has reached the maximum potential.  It also looks like he wasn’t cooperative stating he didn’t see the point of it.  Looks like the nurse is still going maybe for a couple more visits. Will keep you updated\".       "
Normal rate, regular rhythm.  Heart sounds S1, S2

## 2022-11-02 NOTE — PATIENT PROFILE ADULT - NSASFALLNEEDSASSIST_GEN_A_NUR
Patient called for medication refill for  -Lancets  -Test Strips  -Nystatin Cream  -Procto-Med 2   Patient also stated in the message he would need the   Hydrocortisone cream But I do not see that medication in his list  Please review and refill as appropriate   Thanks no